# Patient Record
(demographics unavailable — no encounter records)

---

## 2024-10-08 NOTE — HEALTH RISK ASSESSMENT
[Patient reported PAP Smear was normal] : Patient reported PAP Smear was normal [Good] : ~his/her~  mood as  good [Yes] : Yes [4 or more  times a week (4 pts)] : 4 or more  times a week (4 points) [1 or 2 (0 pts)] : 1 or 2 (0 points) [Never (0 pts)] : Never (0 points) [0] : 2) Feeling down, depressed, or hopeless: Not at all (0) [PHQ-2 Negative - No further assessment needed] : PHQ-2 Negative - No further assessment needed [Never] : Never [YMP0Fhcwv] : 0 [MammogramDate] : 06/23 [PapSmearDate] : 05/23 [BoneDensityDate] : 06/23 [ColonoscopyDate] : 04/15

## 2024-10-08 NOTE — HISTORY OF PRESENT ILLNESS
[FreeTextEntry1] : GENNARO is a 63-year-old female here for a CPE [de-identified] : 63 year old female here for annual exam feels well no specific complaints : The patient's previous iron levels were within normal range (82 last year, 69 the year before). Her vitamin D level was also good last year. Her colon

## 2024-10-08 NOTE — ASSESSMENT
[FreeTextEntry1] : The patient is due for her annual physical exam and preventive care. Her iron levels and vitamin D levels were within normal range in the previous year. Her cholesterol levels were borderline, and the provider discussed the option of a CT calcium score to assess for any plaque buildup in her heart arteries. - Problems : - Borderline cholesterol levels

## 2024-10-08 NOTE — PLAN
[FreeTextEntry1] : The plan includes discussing the option of a CT calcium score to assess for plaque buildup in the heart arteries, considering the patient's borderline cholesterol levels. The provider also discussed the potential benefits and risks of weight loss medications, as well as the importance of strength training and protein intake for maintaining muscle mass as the patient ages. - Plan : - Discuss the option of a CT calcium score to assess for plaque buildup in the heart arteries  - Consider weight loss medications if the patient is interested, after discussing the potential benefits and risks  - Recommend strength training and resistance exercises, particularly for the lower body, to maintain muscle mass  - Encourage adequate protein intake, potentially through protein drinks or supplements, to support muscle maintenance  - Administer flu shot and shingles vaccine (s

## 2024-10-08 NOTE — COUNSELING
[de-identified] : rec strength training 2 x /week for 20 minutes increase protein intake 30 gmat each meal

## 2024-10-21 NOTE — HISTORY OF PRESENT ILLNESS
[postmenopausal] : postmenopausal [Y] : Positive pregnancy history [Currently Active] : currently active [Men] : men [Vaginal] : vaginal [Mammogramdate] : 2023 [TextBox_19] : [place] dangelo [PapSmeardate] : 2023 [TextBox_31] : Negative [BoneDensityDate] : 2023 [TextBox_37] : [place]-osteopenia [ColonoscopyDate] : 2015 [TextBox_43] : Normal, due every 10 years [PGHxTotal] : 3 [[facility] : 1 [[facility] Center[facility]] : 1 [PGHxABSpont] : 2 [FreeTextEntry1] :

## 2024-10-21 NOTE — PHYSICAL EXAM
[Appropriately responsive] : appropriately responsive [Alert] : alert [No Acute Distress] : no acute distress [No Lymphadenopathy] : no lymphadenopathy [Regular Rate Rhythm] : regular rate rhythm [No Murmurs] : no murmurs [Clear to Auscultation B/L] : clear to auscultation bilaterally [Soft] : soft [Non-tender] : non-tender [Non-distended] : non-distended [No HSM] : No HSM [No Lesions] : no lesions [No Mass] : no mass [Oriented x3] : oriented x3 [FreeTextEntry1] : Normal, no lesions [FreeTextEntry2] : Normal, no lesions [FreeTextEntry4] : Normal, no lesions seen or palpated.  Scanty clear discharge in vault [FreeTextEntry5] : Smooth, pink, no lesions.  No cervical motion tenderness [FreeTextEntry6] : Anteverted, small, mobile, nontender.  No adnexal masses or tenderness bilaterally.

## 2024-10-21 NOTE — PLAN
[FreeTextEntry1] : Pap smear drawn and sent.  Prescription for mammogram given.  Discussed with patient postmenopausal vaginal dryness with relations and can try Key-E suppositories twice a week as well as [employer] lube as needed.